# Patient Record
(demographics unavailable — no encounter records)

---

## 2025-04-07 NOTE — HISTORY OF PRESENT ILLNESS
[5] : 5 [0] : 0 [Dull/Aching] : dull/aching [Localized] : localized [Walking] : walking [Exercising] : exercising [Student] : Work status: student

## 2025-04-23 NOTE — DISCUSSION/SUMMARY
[de-identified] : x-ray of the left knee 3 views (AP, lateral, sunrise) taken on 4/7/25  and interpreted on4/21/25 reveal: no acute fracture or dislocation. nof femur xr thoracic 2 views / lumbar 2 views- scoliosis I spoke with the urgent care provider, reviewed notes, and  independently reviewed those images.  hard to measure scoli but real and sound like its progressing , will get formal outside scolis  study  Will obtain MRI of the left femur to eval the tumor and make sure benign and not malignant  Surgery has been suggested a finite treatment. The risks and benefits of surgery have been discussed. Risks include but are not limited to bleeding, infection, reaction to anesthesia, injury to blood vessels and nerves, malunion, nonunion, DVT, PE, necessity of repeat surgery, chronic pain, loss of limb and death. The patient understands the risks and has chosen to proceed with conservative care. All questions have been answered.  prescribed physical therapy, attending 2-3x/week for approximately 4-6 weeks  The left knee is a probable stress reaction vs pfs. if knee pain persists will get dedicated knee mri  follow up after mri femur and scoli series Prescribed the patient Motrin 600mgs T.I.D and discussed risks of side effects and timing and management of medication. Side effects include but are not limited to gi ulcers and irritation, as well as kidney failure and bleeding issues.   I, Waleska Gaona, attest that this documentation has been prepared under the direction and in the presence of Provider Dr. Shin Rascon.

## 2025-04-23 NOTE — PHYSICAL EXAM
[Left] : left knee [] : patella click on motion [FreeTextEntry3] : elevated rib hump with forward bending

## 2025-04-23 NOTE — HISTORY OF PRESENT ILLNESS
[de-identified] : Pt is here for an eval of her left knee. Was running during track and started to hurt, no injury. Some swelling, went down. No n/t. Went to [facility] and got XR. Has been taking break from track just went back today. Taking Advil with relief. No hx knee issues.

## 2025-04-23 NOTE — HISTORY OF PRESENT ILLNESS
[de-identified] : Pt is here for an eval of her left knee. Was running during track and started to hurt, no injury. Some swelling, went down. No n/t. Went to [facility] and got XR. Has been taking break from track just went back today. Taking Advil with relief. No hx knee issues.

## 2025-04-23 NOTE — DISCUSSION/SUMMARY
[de-identified] : x-ray of the left knee 3 views (AP, lateral, sunrise) taken on 4/7/25  and interpreted on4/21/25 reveal: no acute fracture or dislocation. nof femur xr thoracic 2 views / lumbar 2 views- scoliosis I spoke with the urgent care provider, reviewed notes, and  independently reviewed those images.  hard to measure scoli but real and sound like its progressing , will get formal outside scolis  study  Will obtain MRI of the left femur to eval the tumor and make sure benign and not malignant  Surgery has been suggested a finite treatment. The risks and benefits of surgery have been discussed. Risks include but are not limited to bleeding, infection, reaction to anesthesia, injury to blood vessels and nerves, malunion, nonunion, DVT, PE, necessity of repeat surgery, chronic pain, loss of limb and death. The patient understands the risks and has chosen to proceed with conservative care. All questions have been answered.  prescribed physical therapy, attending 2-3x/week for approximately 4-6 weeks  The left knee is a probable stress reaction vs pfs. if knee pain persists will get dedicated knee mri  follow up after mri femur and scoli series Prescribed the patient Motrin 600mgs T.I.D and discussed risks of side effects and timing and management of medication. Side effects include but are not limited to gi ulcers and irritation, as well as kidney failure and bleeding issues.   I, Waleska Gaona, attest that this documentation has been prepared under the direction and in the presence of Provider Dr. Shin Rascon.

## 2025-05-01 NOTE — REASON FOR VISIT
[Initial Evaluation] : an initial evaluation [FreeTextEntry1] : scoliosis evaluation [Patient] : patient [Mother] : mother

## 2025-05-01 NOTE — HISTORY OF PRESENT ILLNESS
[FreeTextEntry1] : Arianna is a 13-year-old female who presents today with her mother for initial evaluation of her spine. Mother reports that their pediatrician recently noticed asymmetries of the back at annual visit and advised family to follow up with an orthopedist. Menarche reported about 1 year ago. Older sister has a hx of scoliosis, though no intervention was required. Otherwise, child is doing well. No issues or complaints. Patient denies any recent fevers, chills, or night sweats. Denies any recent trauma or injuries. She denies any back pain, radiating pain, numbness, tingling sensations, discomfort, weakness to LE, radiating LE pain, or bladder/bowel dysfunction. She has been participating in all her normal physical activities without restrictions or discomfort. Here today for further orthopedic evaluation.

## 2025-05-01 NOTE — ASSESSMENT
[FreeTextEntry1] : Arianna is a 13-year-old female with adolescent idiopathic scoliosis.   Today's assessment was performed with the assistance of the patient's parent as an independent historian given the patient's age. Clinical findings and x-ray results were reviewed at length with the patient and parent. We discussed at length the natural history, etiology, pathoanatomy and treatment modalities of scoliosis with patient and parent. Patient's obtained radiographs are remarkable for scoliosis with a left thoracic curve of 16 degrees. Explained to patient and parent that for curves measuring 25 degrees, a brace regimen is typically implemented for treatment. For curves of 45 degrees or more, surgical intervention is warranted. Child is age 13, Risser 2, and postmenarche 1 year. We will continue with close observation of patient's progression at this time. No further orthopedic interventions were deemed necessary at this time. Patient may continue participating in all physical activities without restrictions. All questions and concerns were addressed. Patient and parent vocalized understanding and agreement to assessment and treatment plan. We will plan to see Arianna back in clinic in approximately 4-6 months for reevaluation with repeat AP and lateral scoliosis x-rays.   Natural history of spine deformity discussed. Risk of progression explained. Spine deformity can cause back pain later on and also arthritis, though usually later. Spine deformity can affect organ systems, such as lungs, less commonly heart and GI etc over time depending on curve size and progression. Deformity can progress with growth but can continue to progress later on based on the size of the curve. It can also effect patient's height due to the curve..It usually does not impact activities and has no limitations, however activities may be limited due to pain or rarely breathlessness with large curves. Scoliosis is usually not impacted by daily activities- sleeping position, sitting position, lifting heavy weights etc, however posture and back pain can be affected by some of these.Stretching, exercises, bone health and nutrition are important factors in the long run. Spine deformity may have genetics etiology and so siblings and progenies should be evaluated.For scoliosis, curves less than 25 degrees are usually managed with observation. Bracing is warranted for curves measuring greater than 25 degrees with skeletal growth remaining. Braces do not correct curves permanently and there is a 30% risk brace failure. Surgery is recommended for scoliosis measuring greater than 45 degrees.  Parent served as the primary historian regarding the above information for this visit to corroborate the patient's history. We also discussed/instructed back, core strengthening and posture correction exercises and going over the proper form as well the need to be regular on a daily basis. Importance was discussed and instructions printed.   I, William Cm, have acted as a scribe and documented the above information for Dr. Robbins on 05/01/2025.   We spent 45 minutes on HPI, Clinical exam, ordering/ reviewing all imaging, reviewing any existing record, reviewing findings and counseling patient to treatment, differentials, etiology, prognosis, natural history, implications on ADLs, activities limitations/modifications, answering questions and addressing concerns, treatment goals and documenting in the EHR.

## 2025-05-01 NOTE — CONSULT LETTER
[Cayetano Childress MD] : Cayetano Childress MD [[facility]] : [facility] [[facility]] : [facility] [[address]] : [address] [[place], [place] [address]] : [place], [place] [address] [[phone]] : [phone] [[phone]] : [phone]

## 2025-05-01 NOTE — DATA REVIEWED
[de-identified] : AP and lateral spine radiographs were ordered, obtained, and independently reviewed in clinic on 05/01/2025 depicting a left thoracic curve measuring 16 degrees. Patient is Risser 2. No obvious deformity on the lateral plane. No evidence of spondylolysis or spondylolisthesis.

## 2025-05-01 NOTE — PHYSICAL EXAM
[FreeTextEntry1] : General: Patient is awake and alert and in no acute distress . oriented to person, place, and time. well developed, well nourished, cooperative.   Skin: The skin is intact, warm, pink, and dry over the area examined.    Eyes: normal conjunctiva, normal eyelids and pupils were equal and round.   ENT: normal ears, normal nose and normal lips.  Cardiovascular: There is brisk capillary refill in the digits of the affected extremity. They are symmetric pulses in the bilateral upper and lower extremities, positive peripheral pulses, brisk capillary refill, but no peripheral edema.  Respiratory: The patient is in no apparent respiratory distress. They're taking full deep breaths without use of accessory muscles or evidence of audible wheezes or stridor without the use of a stethoscope, normal respiratory effort.   Musculoskeletal:.  Examination of the back reveals significant shoulder asymmetry with left shoulder higher than right. Signfiicant scapular asymmetry with left higher than right. Minimal flank asymmetry.  On forward bending, mild left thoracolumbar prominence noted.  Patient is able to bend forward and touch the toes as well bend backwards without pain.  Lateral flexion is symmetrical and is pain free.  Straight leg raising test is free to more than 70 degrees.   Neurological examination reveals a grade 5/5 muscle power.  Sensation is intact to crude touch and pinprick.  Deep tendon reflexes are 1+ with ankle jerk and knee jerk.  The plantars are bilaterally down going.  Superficial abdominal reflexes are symmetric and intact.  The biceps and triceps reflexes are 1+.     There is no hairy patch, lipoma, sinus in the back.  There is no pes cavus, asymmetry of calves, significant leg length discrepancy or significant cafe-au-lait spots.  Child is able to walk on tiptoes as well as heels without difficulty or pain. Child is able to jump and squat

## 2025-05-12 NOTE — DISCUSSION/SUMMARY
[de-identified] : x-ray of the left knee 3 views (AP, lateral, sunrise) taken on 4/7/25  and interpreted on4/21/25 reveal: no acute fracture or dislocation. nof femur MRI of the left femur 4/26/25 [facility] - interpreted on 5/5/25 - stress reaction of the distal femur and proximal tibia. Large bone island in the posterior distal femoral shaft.  Notes from Dr. Rhodes for a scoliosis evaluation stated x-rays showed a 16 degree scoliotic curve.   We reviewed the mri findings and discussed treatment options, both operative and non operative. Discussed risks of potential surgery. However, due to the risks of the surgery, we will try NSAIDs and therapy. Discussed management of medication.  Prescribed the patient Motrin 600mgs T.I.D and discussed risks of side effects and timing and management of medication. Side effects include but are not limited to gi ulcers and irritation, as well as kidney failure and bleeding issues.  Reviewed activity modifications and recommended a gradual return to and increase with activity as tolerated She will follow up on an as needed basis.

## 2025-05-12 NOTE — DATA REVIEWED
[MRI] : MRI [Left] : left [Lower Extremities] : lower extremities [I independently reviewed and interpreted images and report] : I independently reviewed and interpreted images and report [FreeTextEntry1] : stress reaction , stress fracture precursor of femur and knee

## 2025-05-12 NOTE — HISTORY OF PRESENT ILLNESS
[de-identified] : Pt is here for a follow up on left femur pain to go over MRI results. No pain now, 0/10. Had some pain in knee, went away a week after she saw us last 4/21. Not taking any OTC meds, has not started PT (could not get appointment). Back to gym/sports.

## 2025-05-12 NOTE — DISCUSSION/SUMMARY
[de-identified] : x-ray of the left knee 3 views (AP, lateral, sunrise) taken on 4/7/25  and interpreted on4/21/25 reveal: no acute fracture or dislocation. nof femur MRI of the left femur 4/26/25 [facility] - interpreted on 5/5/25 - stress reaction of the distal femur and proximal tibia. Large bone island in the posterior distal femoral shaft.  Notes from Dr. Rhodes for a scoliosis evaluation stated x-rays showed a 16 degree scoliotic curve.   We reviewed the mri findings and discussed treatment options, both operative and non operative. Discussed risks of potential surgery. However, due to the risks of the surgery, we will try NSAIDs and therapy. Discussed management of medication.  Prescribed the patient Motrin 600mgs T.I.D and discussed risks of side effects and timing and management of medication. Side effects include but are not limited to gi ulcers and irritation, as well as kidney failure and bleeding issues.  Reviewed activity modifications and recommended a gradual return to and increase with activity as tolerated She will follow up on an as needed basis.

## 2025-05-12 NOTE — HISTORY OF PRESENT ILLNESS
[de-identified] : Pt is here for a follow up on left femur pain to go over MRI results. No pain now, 0/10. Had some pain in knee, went away a week after she saw us last 4/21. Not taking any OTC meds, has not started PT (could not get appointment). Back to gym/sports.